# Patient Record
Sex: MALE | Race: WHITE | NOT HISPANIC OR LATINO | URBAN - METROPOLITAN AREA
[De-identification: names, ages, dates, MRNs, and addresses within clinical notes are randomized per-mention and may not be internally consistent; named-entity substitution may affect disease eponyms.]

---

## 2021-02-27 ENCOUNTER — EMERGENCY (EMERGENCY)
Facility: HOSPITAL | Age: 56
LOS: 1 days | Discharge: HOME | End: 2021-02-27
Attending: STUDENT IN AN ORGANIZED HEALTH CARE EDUCATION/TRAINING PROGRAM
Payer: MEDICAID

## 2021-02-27 VITALS
HEART RATE: 61 BPM | WEIGHT: 198.42 LBS | RESPIRATION RATE: 16 BRPM | OXYGEN SATURATION: 98 % | TEMPERATURE: 98 F | DIASTOLIC BLOOD PRESSURE: 82 MMHG | SYSTOLIC BLOOD PRESSURE: 137 MMHG

## 2021-02-27 PROCEDURE — 99284 EMERGENCY DEPT VISIT MOD MDM: CPT

## 2021-02-27 PROCEDURE — 99283 EMERGENCY DEPT VISIT LOW MDM: CPT | Mod: 25

## 2021-02-27 PROCEDURE — 73030 X-RAY EXAM OF SHOULDER: CPT | Mod: 26,LT

## 2021-02-27 PROCEDURE — 73030 X-RAY EXAM OF SHOULDER: CPT

## 2021-02-27 RX ORDER — IBUPROFEN 200 MG
600 TABLET ORAL ONCE
Refills: 0 | Status: COMPLETED | OUTPATIENT
Start: 2021-02-27 | End: 2021-02-27

## 2021-02-27 RX ADMIN — Medication 600 MILLIGRAM(S): at 20:23

## 2021-02-27 NOTE — ED PROVIDER NOTE - ATTENDING CONTRIBUTION TO CARE
Patient presenting s/p fall  endorses arm/shoulder pain  no deformity  sensation and rom intact  will obtain xray. ro fracture and dislocation, pain control and reassess

## 2021-02-27 NOTE — ED PROVIDER NOTE - OBJECTIVE STATEMENT
Polish translation provided by [employer]  Nery [id number].  54 y/o M with a significant PMHx of HTN presents to the ED with complaints of L shoulder pain. As per patient, he slipped and fell yesterday on his L arm; denies injury to other areas. Patient complaining of sharp L arm pain, rated 7/10 with movement and palpation. Denies LOC, head trauma, dizziness, weakness, numbness, tingling or any other acute complaints. Patient on amlodipine 10mg. Patient has unknown pain patch and reports taking Tylenol with moderate relief. No allergies. No significant past surgical history.

## 2021-02-27 NOTE — ED PROVIDER NOTE - PROGRESS NOTE DETAILS
No fracture, recommend analgesia. Pt is well appearing walking with steady gait, stable for discharge and follow up without fail with medical doctor. I had a detailed discussion with the patient and/or guardian regarding the historical points, exam findings, and any diagnostic results supporting the discharge diagnosis. Pt educated on care and need for follow up. Strict return instructions and red flag signs and symptoms discussed with patient. Questions answered. Pt shows understanding of discharge information and agrees to follow.

## 2021-02-27 NOTE — ED PROVIDER NOTE - UPPER EXTREMITY EXAM, LEFT
No pertinent family history in first degree relatives L extremity warm, unable to lift up arm, unable to bring arm across chest, positive Apley scratch test, clicking felt with ROM, no swelling noted, no erythema, skin intact/LIMITED ROM

## 2021-02-27 NOTE — ED PROVIDER NOTE - PATIENT PORTAL LINK FT
You can access the FollowMyHealth Patient Portal offered by [facility] by registering at the following website: [email or web address]. By joining [employer]’s FollowMyHealth portal, you will also be able to view your health information using other applications (apps) compatible with our system.

## 2021-02-27 NOTE — ED ADULT NURSE NOTE - OBJECTIVE STATEMENT
As per pt, c/o L shoulder pain s/p trip and fall on glass yesterday. NO other obvious traumas noted. Pt denies LOC, h/a, dizziness, blurred vision, f/c, n/v/d, CP, SOB, or cough.

## 2021-02-27 NOTE — ED PROVIDER NOTE - CHPI ED SYMPTOMS NEG
DISCHARGE PLANNING    • Discharge to home or other facility with appropriate resources Progressing        GASTROINTESTINAL - ADULT    • Minimal or absence of nausea and vomiting Progressing        GENITOURINARY - ADULT    • Absence of urinary retention Pro no LOC, no dizziness/no numbness/no tingling/no weakness

## 2021-07-16 ENCOUNTER — EMERGENCY (EMERGENCY)
Facility: HOSPITAL | Age: 56
LOS: 1 days | Discharge: HOME | End: 2021-07-16
Attending: EMERGENCY MEDICINE
Payer: MEDICAID

## 2021-07-16 VITALS
TEMPERATURE: 98 F | WEIGHT: 184.97 LBS | RESPIRATION RATE: 18 BRPM | OXYGEN SATURATION: 99 % | HEART RATE: 61 BPM | DIASTOLIC BLOOD PRESSURE: 80 MMHG | SYSTOLIC BLOOD PRESSURE: 139 MMHG

## 2021-07-16 LAB
ALBUMIN SERPL ELPH-MCNC: 3.9 G/DL — SIGNIFICANT CHANGE UP (ref 3.5–5)
ALP SERPL-CCNC: 77 U/L — SIGNIFICANT CHANGE UP (ref 40–120)
ALT FLD-CCNC: 34 U/L DA — SIGNIFICANT CHANGE UP (ref 10–60)
ANION GAP SERPL CALC-SCNC: 5 MMOL/L — SIGNIFICANT CHANGE UP (ref 5–17)
AST SERPL-CCNC: 22 U/L — SIGNIFICANT CHANGE UP (ref 10–40)
BASOPHILS # BLD AUTO: 0.03 K/UL — SIGNIFICANT CHANGE UP (ref 0–0.2)
BASOPHILS NFR BLD AUTO: 0.4 % — SIGNIFICANT CHANGE UP (ref 0–2)
BILIRUB SERPL-MCNC: 0.4 MG/DL — SIGNIFICANT CHANGE UP (ref 0.2–1.2)
BUN SERPL-MCNC: 16 MG/DL — SIGNIFICANT CHANGE UP (ref 7–18)
CALCIUM SERPL-MCNC: 9.2 MG/DL — SIGNIFICANT CHANGE UP (ref 8.4–10.5)
CHLORIDE SERPL-SCNC: 108 MMOL/L — SIGNIFICANT CHANGE UP (ref 96–108)
CK MB BLD-MCNC: 1.1 % — SIGNIFICANT CHANGE UP (ref 0–3.5)
CK MB CFR SERPL CALC: 1.1 NG/ML — SIGNIFICANT CHANGE UP (ref 0–3.6)
CK SERPL-CCNC: 101 U/L — SIGNIFICANT CHANGE UP (ref 35–232)
CO2 SERPL-SCNC: 27 MMOL/L — SIGNIFICANT CHANGE UP (ref 22–31)
CREAT SERPL-MCNC: 0.74 MG/DL — SIGNIFICANT CHANGE UP (ref 0.5–1.3)
EOSINOPHIL # BLD AUTO: 0.25 K/UL — SIGNIFICANT CHANGE UP (ref 0–0.5)
EOSINOPHIL NFR BLD AUTO: 3.5 % — SIGNIFICANT CHANGE UP (ref 0–6)
GLUCOSE SERPL-MCNC: 90 MG/DL — SIGNIFICANT CHANGE UP (ref 70–99)
HCT VFR BLD CALC: 43.3 % — SIGNIFICANT CHANGE UP (ref 39–50)
HGB BLD-MCNC: 14.9 G/DL — SIGNIFICANT CHANGE UP (ref 13–17)
IMM GRANULOCYTES NFR BLD AUTO: 0.3 % — SIGNIFICANT CHANGE UP (ref 0–1.5)
LYMPHOCYTES # BLD AUTO: 2.39 K/UL — SIGNIFICANT CHANGE UP (ref 1–3.3)
LYMPHOCYTES # BLD AUTO: 33.8 % — SIGNIFICANT CHANGE UP (ref 13–44)
MCHC RBC-ENTMCNC: 30.8 PG — SIGNIFICANT CHANGE UP (ref 27–34)
MCHC RBC-ENTMCNC: 34.4 GM/DL — SIGNIFICANT CHANGE UP (ref 32–36)
MCV RBC AUTO: 89.6 FL — SIGNIFICANT CHANGE UP (ref 80–100)
MONOCYTES # BLD AUTO: 0.52 K/UL — SIGNIFICANT CHANGE UP (ref 0–0.9)
MONOCYTES NFR BLD AUTO: 7.3 % — SIGNIFICANT CHANGE UP (ref 2–14)
NEUTROPHILS # BLD AUTO: 3.87 K/UL — SIGNIFICANT CHANGE UP (ref 1.8–7.4)
NEUTROPHILS NFR BLD AUTO: 54.7 % — SIGNIFICANT CHANGE UP (ref 43–77)
NRBC # BLD: 0 /100 WBCS — SIGNIFICANT CHANGE UP (ref 0–0)
PLATELET # BLD AUTO: 247 K/UL — SIGNIFICANT CHANGE UP (ref 150–400)
POTASSIUM SERPL-MCNC: 4.3 MMOL/L — SIGNIFICANT CHANGE UP (ref 3.5–5.3)
POTASSIUM SERPL-SCNC: 4.3 MMOL/L — SIGNIFICANT CHANGE UP (ref 3.5–5.3)
PROT SERPL-MCNC: 7.8 G/DL — SIGNIFICANT CHANGE UP (ref 6–8.3)
RBC # BLD: 4.83 M/UL — SIGNIFICANT CHANGE UP (ref 4.2–5.8)
RBC # FLD: 11.6 % — SIGNIFICANT CHANGE UP (ref 10.3–14.5)
SODIUM SERPL-SCNC: 140 MMOL/L — SIGNIFICANT CHANGE UP (ref 135–145)
TROPONIN I SERPL-MCNC: <0.015 NG/ML — SIGNIFICANT CHANGE UP (ref 0–0.04)
TROPONIN I SERPL-MCNC: <0.015 NG/ML — SIGNIFICANT CHANGE UP (ref 0–0.04)
WBC # BLD: 7.08 K/UL — SIGNIFICANT CHANGE UP (ref 3.8–10.5)
WBC # FLD AUTO: 7.08 K/UL — SIGNIFICANT CHANGE UP (ref 3.8–10.5)

## 2021-07-16 PROCEDURE — 80053 COMPREHEN METABOLIC PANEL: CPT

## 2021-07-16 PROCEDURE — 71046 X-RAY EXAM CHEST 2 VIEWS: CPT | Mod: 26

## 2021-07-16 PROCEDURE — 93010 ELECTROCARDIOGRAM REPORT: CPT

## 2021-07-16 PROCEDURE — 36415 COLL VENOUS BLD VENIPUNCTURE: CPT

## 2021-07-16 PROCEDURE — 71046 X-RAY EXAM CHEST 2 VIEWS: CPT

## 2021-07-16 PROCEDURE — 93005 ELECTROCARDIOGRAM TRACING: CPT

## 2021-07-16 PROCEDURE — 99283 EMERGENCY DEPT VISIT LOW MDM: CPT | Mod: 25

## 2021-07-16 PROCEDURE — 85025 COMPLETE CBC W/AUTO DIFF WBC: CPT

## 2021-07-16 PROCEDURE — 84484 ASSAY OF TROPONIN QUANT: CPT

## 2021-07-16 PROCEDURE — 82553 CREATINE MB FRACTION: CPT

## 2021-07-16 PROCEDURE — 99285 EMERGENCY DEPT VISIT HI MDM: CPT

## 2021-07-16 PROCEDURE — 82550 ASSAY OF CK (CPK): CPT

## 2021-07-16 NOTE — ED ADULT NURSE NOTE - OBJECTIVE STATEMENT
pt is here for chest pain.  As per family member, chest pain for 3 days, denied fever or sob, denied N/v/D, no distress noted at this time.

## 2021-07-16 NOTE — ED ADULT NURSE NOTE - NS ED NOTE ABUSE RESPONSE YN
Render Note In Bullet Format When Appropriate: No Detail Level: Simple Consent: The patient's consent was obtained including but not limited to risks of crusting, scabbing, blistering, scarring, darker or lighter pigmentary change, recurrence, incomplete removal and infection. Medical Necessity Information: It is in your best interest to select a reason for this procedure from the list below. All of these items fulfill various CMS LCD requirements except the new and changing color options. Number Of Freeze-Thaw Cycles: 2 freeze-thaw cycles Medical Necessity Clause: This procedure was medically necessary because the lesions that were treated were: Post-Care Instructions: I reviewed with the patient in detail post-care instructions. Patient is to wear sunprotection, and avoid picking at any of the treated lesions. Pt may apply Vaseline to crusted or scabbing areas. Duration Of Freeze Thaw-Cycle (Seconds): 5-10 Yes

## 2021-07-16 NOTE — ED PROVIDER NOTE - PATIENT PORTAL LINK FT
You can access the FollowMyHealth Patient Portal offered by [facility] by registering at the following website: [email or web address]. By joining [employer]’s FollowMyHealth portal, you will also be able to view your health information using other applications (apps) compatible with our system.

## 2021-07-16 NOTE — ED PROVIDER NOTE - PROGRESS NOTE DETAILS
trop negative x2, CXR negative.  pt reassessed, chest pain free.  Will dc.  pt given copy of all results.

## 2021-07-16 NOTE — ED PROVIDER NOTE - NSFOLLOWUPINSTRUCTIONS_ED_ALL_ED_FT
Chest Pain    WHAT YOU NEED TO KNOW:    Chest pain can be caused by a range of conditions, from not serious to life-threatening. Chest pain can be a symptom of a digestive problem, such as acid reflux or a stomach ulcer. An anxiety attack or a strong emotion, such as anger, can also cause chest pain. Infection, inflammation, or a fracture in the bones or cartilage in your chest can cause pain or discomfort. Sometimes chest pain or pressure is caused by poor blood flow to your heart (angina). Chest pain may also be caused by life-threatening conditions such as a heart attack or blood clot in your lungs.    DISCHARGE INSTRUCTIONS:    Call your local emergency number (911 in the ) or have someone call if:   •You have any of the following signs of a heart attack: ?Squeezing, pressure, or pain in your chest      ?You may also have any of the following: ?Discomfort or pain in your back, neck, jaw, stomach, or arm      ?Shortness of breath      ?Nausea or vomiting      ?Lightheadedness or a sudden cold sweat            Seek care immediately if:   •You have chest discomfort that gets worse, even with medicine.      •You cough or vomit blood.      •Your bowel movements are black or bloody.      •You cannot stop vomiting, or it hurts to swallow.      Call your doctor if:   •You have questions or concerns about your condition or care.          Medicines:   •Medicines may be given to treat the cause of your chest pain. Examples include pain medicine, anxiety medicine, or medicines to increase blood flow to your heart.      •Do not take certain medicines without asking your healthcare provider first. These include NSAIDs, herbal or vitamin supplements, or hormones (estrogen or progestin).      •Take your medicine as directed. Contact your healthcare provider if you think your medicine is not helping or if you have side effects. Tell him or her if you are allergic to any medicine. Keep a list of the medicines, vitamins, and herbs you take. Include the amounts, and when and why you take them. Bring the list or the pill bottles to follow-up visits. Carry your medicine list with you in case of an emergency.      Healthy living tips: The following are general healthy guidelines. If the cause of your chest pain is known, your healthcare provider will give you specific guidelines to follow.  •Do not smoke. Nicotine and other chemicals in cigarettes and cigars can cause lung and heart damage. Ask your healthcare provider for information if you currently smoke and need help to quit. E-cigarettes or smokeless tobacco still contain nicotine. Talk to your healthcare provider before you use these products.      •Choose a variety of healthy foods as often as possible. Include fresh, frozen, or canned fruits and vegetables. Also include low-fat dairy products, fish, chicken (without skin), and lean meats. Your healthcare provider or a dietitian can help you create meal plans. You may need to avoid certain foods or drinks if your pain is caused by a digestion problem.  Healthy Foods           •Lower your sodium (salt) intake. Limit foods that are high in sodium, such as canned foods, salty snacks, and cold cuts. If you add salt when you cook food, do not add more at the table. Choose low-sodium canned foods as much as possible.             •Drink plenty of water every day. Water helps your body to control temperature and blood pressure. Ask your healthcare provider how much water you should drink every day.      •Ask about activity. Your healthcare provider will tell you which activities to limit or avoid. Ask when you can drive, return to work, and have sex. Ask about the best exercise plan for you.      •Maintain a healthy weight. Ask your healthcare provider what a healthy weight is for you. Ask him or her to help you create a safe weight loss plan if you are overweight.      •Ask about vaccines you may need. Get the influenza (flu) vaccine every year as soon as recommended, usually in September or October. You may also need a pneumococcal vaccine to prevent pneumonia. The vaccine is usually given every 5 years, starting at age 65. Your healthcare provider can tell you if should get other vaccines, and when to get them.      Follow up with your healthcare provider within 72 hours, or as directed: You may need to return for more tests to find the cause of your chest pain. You may be referred to a specialist, such as a cardiologist or gastroenterologist. Write down your questions so you remember to ask them during your visits.       © Copyright [employer] 2021           back to top                          © Copyright [employer] 2021

## 2021-07-16 NOTE — ED PROVIDER NOTE - OBJECTIVE STATEMENT
Patient is Turkmen speaker,  offered, patient refers to use son. 56 y.o male with history of HTN presents to the ED complaining of chest pain associated with shortness of breath for x3 days, that's worse today. Patient denies drinking and smoking. Patient reports that her symptoms are worse with exertion, but resolves with rest. Patient also reports that her chest pain is on the left side, with no radiation.  Patient denies fever, cough, nausea, vomiting, diaphoresis, or any other complaints. NKDA.

## 2021-07-17 PROBLEM — I10 ESSENTIAL (PRIMARY) HYPERTENSION: Chronic | Status: ACTIVE | Noted: 2021-02-27

## 2021-12-27 ENCOUNTER — APPOINTMENT (OUTPATIENT)
Dept: SURGERY | Facility: CLINIC | Age: 56
End: 2021-12-27
Payer: MEDICAID

## 2021-12-27 VITALS
HEART RATE: 80 BPM | SYSTOLIC BLOOD PRESSURE: 143 MMHG | WEIGHT: 197 LBS | HEIGHT: 67 IN | DIASTOLIC BLOOD PRESSURE: 83 MMHG | BODY MASS INDEX: 30.92 KG/M2

## 2021-12-27 VITALS — TEMPERATURE: 96.4 F

## 2021-12-27 DIAGNOSIS — Z78.9 OTHER SPECIFIED HEALTH STATUS: ICD-10-CM

## 2021-12-27 DIAGNOSIS — Z86.79 PERSONAL HISTORY OF OTHER DISEASES OF THE CIRCULATORY SYSTEM: ICD-10-CM

## 2021-12-27 DIAGNOSIS — Z82.5 FAMILY HISTORY OF ASTHMA AND OTHER CHRONIC LOWER RESPIRATORY DISEASES: ICD-10-CM

## 2021-12-27 PROBLEM — Z00.00 ENCOUNTER FOR PREVENTIVE HEALTH EXAMINATION: Status: ACTIVE | Noted: 2021-12-27

## 2021-12-27 PROCEDURE — 99243 OFF/OP CNSLTJ NEW/EST LOW 30: CPT

## 2021-12-27 PROCEDURE — 99203 OFFICE O/P NEW LOW 30 MIN: CPT

## 2021-12-27 RX ORDER — AMLODIPINE BESYLATE 5 MG/1
TABLET ORAL
Refills: 0 | Status: ACTIVE | COMMUNITY

## 2021-12-27 NOTE — HISTORY OF PRESENT ILLNESS
[de-identified] : This is a 56 year  old patient who was referred by   Dr Mccracken with the chief complaint of having  back mass.  He reports having this condition for 10 years. He denies any trauma to the area.   He denies any fever or  night sweats. Appetite is good and weight is stable.  He states that recently the mass started to  get  bigger and  more symptomatic. He wants to know if it could  be surgically  removed.\par \par

## 2021-12-27 NOTE — PHYSICAL EXAM
[de-identified] : midback mass is mobile, Firm, Smooth, non-tender,   Well defined. Superficial. No palpable lymph nodes.   Mass size - 4  cm x   3 cm.

## 2021-12-27 NOTE — PLAN
[FreeTextEntry1] : Mr. AARON  was told significance of findings, options, risks and benefits were explained.  Informed consent for excision midback mass and potential risks, benefits and alternatives (surgical options were discussed including non-surgical options or the option of no surgery) to the planned surgery were discussed in depth.  All surgical options were discussed including non-surgical treatments.  He wishes to proceed with surgery.  We will plan for surgery on at the next available date, pending any required insurance pre-certification or pre-approval. He agrees to obtain any necessary pre-operative evaluations and testing prior to surgery.\par Patient advised to seek immediate medical attention with any acute change in symptoms or with the development of any new or worsening symptoms.  Patient's questions and concerns addressed to patient's satisfaction, and patient verbalized an understanding of the information discussed.\par \par

## 2021-12-27 NOTE — CONSULT LETTER
[Dear  ___] : Dear  [unfilled], [Consult Letter:] : I had the pleasure of evaluating your patient, [unfilled]. [Please see my note below.] : Please see my note below. [Consult Closing:] : Thank you very much for allowing me to participate in the care of this patient.  If you have any questions, please do not hesitate to contact me. [Sincerely,] : Sincerely, [FreeTextEntry3] : Nick Merchant MD, FACS

## 2022-01-03 ENCOUNTER — OUTPATIENT (OUTPATIENT)
Dept: OUTPATIENT SERVICES | Facility: HOSPITAL | Age: 57
LOS: 1 days | End: 2022-01-03
Payer: MEDICAID

## 2022-01-03 VITALS
DIASTOLIC BLOOD PRESSURE: 83 MMHG | TEMPERATURE: 98 F | HEART RATE: 60 BPM | RESPIRATION RATE: 16 BRPM | HEIGHT: 66 IN | WEIGHT: 195.11 LBS | SYSTOLIC BLOOD PRESSURE: 131 MMHG | OXYGEN SATURATION: 100 %

## 2022-01-03 DIAGNOSIS — Z01.818 ENCOUNTER FOR OTHER PREPROCEDURAL EXAMINATION: ICD-10-CM

## 2022-01-03 DIAGNOSIS — M79.89 OTHER SPECIFIED SOFT TISSUE DISORDERS: ICD-10-CM

## 2022-01-03 PROCEDURE — G0463: CPT

## 2022-01-03 NOTE — H&P PST ADULT - NS PRO FEM  PAP SMEARS 3YRS
Called mom and provided Dr. Rawland Cabot instructions. Per mom almost at the ED and verbalized understanding. not applicable (Male)

## 2022-01-03 NOTE — H&P PST ADULT - PROBLEM SELECTOR PLAN 3
Continue Atorvastatin and take with sips of water on day of surgery.  Follow-up with PCP postop for management.

## 2022-01-03 NOTE — H&P PST ADULT - PROBLEM SELECTOR PLAN 2
Bahamian translation provided , , written instructions explained , pt verbalized understanding of  instructions including how to use shower in am of sx, bottle of Chlorhexidine given to pt for pt for shower. NPO since midnight

## 2022-01-03 NOTE — H&P PST ADULT - NEGATIVE ENMT SYMPTOMS
no hearing difficulty/no ear pain/no tinnitus/no vertigo/no gum bleeding/no dry mouth/no throat pain

## 2022-01-03 NOTE — H&P PST ADULT - NEGATIVE GASTROINTESTINAL SYMPTOMS
How Severe Is Your Skin Discoloration?: mild no nausea/no vomiting/no diarrhea/no constipation/no change in bowel habits/no flatulence

## 2022-01-03 NOTE — H&P PST ADULT - NEGATIVE NEUROLOGICAL SYMPTOMS
no transient paralysis/no weakness/no tremors/no vertigo/no difficulty walking/no headache/no hemiparesis

## 2022-01-03 NOTE — H&P PST ADULT - PROBLEM SELECTOR PLAN 1
TAMI stop bang scale 3 , intermediate risk for TAMI, OR booking notified , maintain  TAMI precautions, monitor pr close in periop time

## 2022-01-03 NOTE — H&P PST ADULT - ASSESSMENT
56 years old male PMH HTN and HLD presented to [facility] for evaluation before sx , pt was dx with other specified soft tissue disorders and is scheduled for excision of midback mass on 1/5/2022.

## 2022-01-03 NOTE — H&P PST ADULT - DOES PATIENT HAVE ADVANCE DIRECTIVE
son to be called in case of emergency/No son to be called in case of emergency to make medical decisions/No

## 2022-01-03 NOTE — H&P PST ADULT - NSICDXFAMILYHX_GEN_ALL_CORE_FT
FAMILY HISTORY:  Father  Still living? No  FH: asthma, Age at diagnosis: Age Unknown    Mother  Still living? No  FHx: kidney failure, Age at diagnosis: Age Unknown

## 2022-01-04 DIAGNOSIS — I10 ESSENTIAL (PRIMARY) HYPERTENSION: ICD-10-CM

## 2022-01-04 DIAGNOSIS — G47.33 OBSTRUCTIVE SLEEP APNEA (ADULT) (PEDIATRIC): ICD-10-CM

## 2022-01-04 DIAGNOSIS — Z29.9 ENCOUNTER FOR PROPHYLACTIC MEASURES, UNSPECIFIED: ICD-10-CM

## 2022-01-04 DIAGNOSIS — E78.5 HYPERLIPIDEMIA, UNSPECIFIED: ICD-10-CM

## 2022-01-04 DIAGNOSIS — M79.89 OTHER SPECIFIED SOFT TISSUE DISORDERS: ICD-10-CM

## 2022-01-10 LAB — SARS-COV-2 N GENE NPH QL NAA+PROBE: DETECTED

## 2022-01-14 PROBLEM — E78.5 HYPERLIPIDEMIA, UNSPECIFIED: Chronic | Status: ACTIVE | Noted: 2022-01-03

## 2022-02-09 ENCOUNTER — OUTPATIENT (OUTPATIENT)
Dept: OUTPATIENT SERVICES | Facility: HOSPITAL | Age: 57
LOS: 1 days | End: 2022-02-09
Payer: MEDICAID

## 2022-02-09 VITALS
HEART RATE: 61 BPM | RESPIRATION RATE: 18 BRPM | DIASTOLIC BLOOD PRESSURE: 76 MMHG | WEIGHT: 190.04 LBS | TEMPERATURE: 99 F | SYSTOLIC BLOOD PRESSURE: 116 MMHG | HEIGHT: 66 IN | OXYGEN SATURATION: 98 %

## 2022-02-09 DIAGNOSIS — M79.89 OTHER SPECIFIED SOFT TISSUE DISORDERS: ICD-10-CM

## 2022-02-09 DIAGNOSIS — Z91.89 OTHER SPECIFIED PERSONAL RISK FACTORS, NOT ELSEWHERE CLASSIFIED: ICD-10-CM

## 2022-02-09 DIAGNOSIS — I10 ESSENTIAL (PRIMARY) HYPERTENSION: ICD-10-CM

## 2022-02-09 DIAGNOSIS — E78.5 HYPERLIPIDEMIA, UNSPECIFIED: ICD-10-CM

## 2022-02-09 DIAGNOSIS — K21.9 GASTRO-ESOPHAGEAL REFLUX DISEASE WITHOUT ESOPHAGITIS: ICD-10-CM

## 2022-02-09 DIAGNOSIS — Z01.818 ENCOUNTER FOR OTHER PREPROCEDURAL EXAMINATION: ICD-10-CM

## 2022-02-09 DIAGNOSIS — R22.2 LOCALIZED SWELLING, MASS AND LUMP, TRUNK: ICD-10-CM

## 2022-02-09 PROCEDURE — G0463: CPT

## 2022-02-09 RX ORDER — SODIUM CHLORIDE 9 MG/ML
3 INJECTION INTRAMUSCULAR; INTRAVENOUS; SUBCUTANEOUS EVERY 8 HOURS
Refills: 0 | Status: DISCONTINUED | OUTPATIENT
Start: 2022-02-11 | End: 2022-02-18

## 2022-02-09 NOTE — H&P PST ADULT - PROBLEM SELECTOR PLAN 3
TAMI stop bang score 4. Pt never had sleep studies done, is at intermediate risk for sleep apnea and is at risk for pulmonary complications. Perioperative pulmonary precautions to be maintained.

## 2022-02-09 NOTE — H&P PST ADULT - PROBLEM SELECTOR PLAN 1
Excision of mid back mass on 02/11/2022. Pt optimized for procedure by PCP.   Preoperative instructions discussed with pt and given to pt. Instructed pt to notify security when he arrives in the lobby of the hospital that he is here for surgery, that he will need someone to come to the hospital to pick him up after surgery, not to eat or drink anything after midnight the night before the surgery, to avoid NSAIDs such as Ibuprofen, Motrin, Aleve, Advil, naproxen before surgery, to take Tylenol if needed for pain, to report if he has been exposed to anyone with any contagious diseases including Covid-19 or if he is exhibiting any symptoms of COVID-19,  to keep appointment for COVID-19  test 3 days before surgery. Instructed about use of Chlorhexidine 4% soap before surgery. Verbalized understanding of instructions given. Excision of mid back mass on 02/11/2022. Pt optimized for procedure by PCP.   Preoperative instructions discussed with pt via Citizen of [place] speaking wife and wife. Written instructions given to pt. Instructed pt to notify security when he arrives in the lobby of the hospital that he is here for surgery, that he will need someone to come to the hospital to pick him up after surgery, not to eat or drink anything after midnight the night before the surgery, to avoid NSAIDs such as Ibuprofen, Motrin, Aleve, Advil, naproxen before surgery, to take Tylenol if needed for pain, to report if he has been exposed to anyone with any contagious diseases including Covid-19 or if he is exhibiting any symptoms of COVID-19. Instructed about use of Chlorhexidine 4% soap before coming to the hospital the morning before surgery. Verbalized understanding of instructions given.

## 2022-02-09 NOTE — H&P PST ADULT - NEGATIVE GASTROINTESTINAL SYMPTOMS
no nausea/no vomiting/no diarrhea/no constipation no nausea/no vomiting/no diarrhea/no constipation/no change in bowel habits/no flatulence/no abdominal pain/no melena

## 2022-02-09 NOTE — H&P PST ADULT - PROBLEM SELECTOR PLAN 2
Instructed to continue Amlodipine and to take it with sips of water on day of surgery. Follow-up with PCP postop for lipid management.

## 2022-02-09 NOTE — H&P PST ADULT - ASSESSMENT
This is a 56 yr old Russian male with PMH of HTN, HLD, GERD, COVID-19 virus infection in December 2021 who presents with midback mass. Pt is scheduled for excision of mid back mass on 02/11/2022.  TAMI stop bang score 4. Pt never had sleep studies done, is at intermediate risk for sleep apnea and is at risk for pulmonary complications.

## 2022-02-09 NOTE — H&P PST ADULT - NSICDXPASTMEDICALHX_GEN_ALL_CORE_FT
PAST MEDICAL HISTORY:  HLD (hyperlipidemia)     HTN (hypertension)     Mass on back      PAST MEDICAL HISTORY:  COVID-19 virus infection December 2021    GERD (gastroesophageal reflux disease)     HLD (hyperlipidemia)     HTN (hypertension)     Mass on back

## 2022-02-09 NOTE — H&P PST ADULT - HISTORY OF PRESENT ILLNESS
This is a 56 yr old Russian male with PMH of HTN, HLD, GERD who presents with c/o midback mass for years that has been enlarging and causing discomfort. Pt is scheduled for excision of mid back mass on 02/11/2022. This is a 56 yr old Russian male with PMH of HTN, HLD, GERD, COVID-19 virus infection in December 2021 who presents with c/o midback mass for years that has been enlarging and causing discomfort. Pt is scheduled for excision of mid back mass on 02/11/2022.

## 2022-02-10 ENCOUNTER — TRANSCRIPTION ENCOUNTER (OUTPATIENT)
Age: 57
End: 2022-02-10

## 2022-02-11 ENCOUNTER — RESULT REVIEW (OUTPATIENT)
Age: 57
End: 2022-02-11

## 2022-02-11 ENCOUNTER — OUTPATIENT (OUTPATIENT)
Dept: OUTPATIENT SERVICES | Facility: HOSPITAL | Age: 57
LOS: 1 days | End: 2022-02-11
Payer: MEDICAID

## 2022-02-11 ENCOUNTER — APPOINTMENT (OUTPATIENT)
Dept: SURGERY | Facility: HOSPITAL | Age: 57
End: 2022-02-11
Payer: MEDICAID

## 2022-02-11 VITALS
HEART RATE: 70 BPM | TEMPERATURE: 98 F | SYSTOLIC BLOOD PRESSURE: 138 MMHG | RESPIRATION RATE: 18 BRPM | DIASTOLIC BLOOD PRESSURE: 81 MMHG | OXYGEN SATURATION: 96 %

## 2022-02-11 VITALS
HEIGHT: 66 IN | SYSTOLIC BLOOD PRESSURE: 116 MMHG | WEIGHT: 190.04 LBS | DIASTOLIC BLOOD PRESSURE: 76 MMHG | RESPIRATION RATE: 18 BRPM | OXYGEN SATURATION: 98 % | TEMPERATURE: 99 F | HEART RATE: 61 BPM

## 2022-02-11 DIAGNOSIS — M79.89 OTHER SPECIFIED SOFT TISSUE DISORDERS: ICD-10-CM

## 2022-02-11 DIAGNOSIS — Z01.818 ENCOUNTER FOR OTHER PREPROCEDURAL EXAMINATION: ICD-10-CM

## 2022-02-11 PROCEDURE — 21931 EXC BACK LES SC 3 CM/>: CPT | Mod: AS

## 2022-02-11 PROCEDURE — 11406 EXC TR-EXT B9+MARG >4.0 CM: CPT

## 2022-02-11 PROCEDURE — 88305 TISSUE EXAM BY PATHOLOGIST: CPT | Mod: 26

## 2022-02-11 PROCEDURE — 12032 INTMD RPR S/A/T/EXT 2.6-7.5: CPT

## 2022-02-11 PROCEDURE — 21931 EXC BACK LES SC 3 CM/>: CPT

## 2022-02-11 PROCEDURE — 88305 TISSUE EXAM BY PATHOLOGIST: CPT

## 2022-02-11 RX ORDER — OXYCODONE AND ACETAMINOPHEN 5; 325 MG/1; MG/1
1 TABLET ORAL EVERY 4 HOURS
Refills: 0 | Status: DISCONTINUED | OUTPATIENT
Start: 2022-02-11 | End: 2022-02-11

## 2022-02-11 RX ORDER — ATORVASTATIN CALCIUM 80 MG/1
1 TABLET, FILM COATED ORAL
Qty: 0 | Refills: 0 | DISCHARGE

## 2022-02-11 RX ORDER — OMEPRAZOLE 10 MG/1
1 CAPSULE, DELAYED RELEASE ORAL
Qty: 0 | Refills: 0 | DISCHARGE

## 2022-02-11 RX ORDER — AMLODIPINE BESYLATE 2.5 MG/1
1 TABLET ORAL
Qty: 0 | Refills: 0 | DISCHARGE

## 2022-02-11 RX ADMIN — SODIUM CHLORIDE 3 MILLILITER(S): 9 INJECTION INTRAMUSCULAR; INTRAVENOUS; SUBCUTANEOUS at 10:51

## 2022-02-11 NOTE — ASU DISCHARGE PLAN (ADULT/PEDIATRIC) - NS MD DC FALL RISK RISK
For information on Fall & Injury Prevention, visit: [email or web address] OR  [email or web address] OR  https://www.cdc.gov/steadi/patient.html

## 2022-02-11 NOTE — ASU DISCHARGE PLAN (ADULT/PEDIATRIC) - CALL YOUR DOCTOR IF YOU HAVE ANY OF THE FOLLOWING:
Fever greater than (need to indicate Fahrenheit or Celsius) Benzoyl Peroxide Pregnancy And Lactation Text: This medication is Pregnancy Category C. It is unknown if benzoyl peroxide is excreted in breast milk.

## 2022-02-11 NOTE — ASU PATIENT PROFILE, ADULT - FALL HARM RISK - UNIVERSAL INTERVENTIONS
Bed in lowest position, wheels locked, appropriate side rails in place/Call bell, personal items and telephone in reach/Instruct patient to call for assistance before getting out of bed or chair/Non-slip footwear when patient is out of bed/[place] to call system/Physically safe environment - no spills, clutter or unnecessary equipment/Purposeful Proactive Rounding/Room/bathroom lighting operational, light cord in reach

## 2022-02-11 NOTE — ASU PATIENT PROFILE, ADULT - NSICDXPASTMEDICALHX_GEN_ALL_CORE_FT
PAST MEDICAL HISTORY:  COVID-19 virus infection December 2021    GERD (gastroesophageal reflux disease)     HLD (hyperlipidemia)     HTN (hypertension)     Mass on back

## 2022-02-11 NOTE — ASU DISCHARGE PLAN (ADULT/PEDIATRIC) - CARE PROVIDER_API CALL
Nick Merchant)  Surgery  [address], [place] [address]  Phone: [phone]  Fax: [phone]  Follow Up Time:

## 2022-02-11 NOTE — ASU PATIENT PROFILE, ADULT - NSICDXNOPASTSURGICALHX_GEN_ALL_CORE
Instructed to call immediately if any new distortion, blurring, decreased vision or eye pain. <-- Click to add NO significant Past Surgical History

## 2022-02-17 LAB — SURGICAL PATHOLOGY STUDY: SIGNIFICANT CHANGE UP

## 2022-02-23 VITALS — TEMPERATURE: 97.3 F

## 2022-02-23 PROBLEM — R22.2 LOCALIZED SWELLING, MASS AND LUMP, TRUNK: Chronic | Status: ACTIVE | Noted: 2022-02-09

## 2022-02-23 PROBLEM — U07.1 COVID-19: Chronic | Status: ACTIVE | Noted: 2022-02-09

## 2022-02-23 PROBLEM — K21.9 GASTRO-ESOPHAGEAL REFLUX DISEASE WITHOUT ESOPHAGITIS: Chronic | Status: ACTIVE | Noted: 2022-02-09

## 2022-02-24 ENCOUNTER — APPOINTMENT (OUTPATIENT)
Dept: SURGERY | Facility: CLINIC | Age: 57
End: 2022-02-24
Payer: MEDICAID

## 2022-02-24 VITALS — TEMPERATURE: 97.3 F

## 2022-02-24 DIAGNOSIS — M79.89 OTHER SPECIFIED SOFT TISSUE DISORDERS: ICD-10-CM

## 2022-02-24 PROCEDURE — 99024 POSTOP FOLLOW-UP VISIT: CPT

## 2022-02-24 NOTE — HISTORY OF PRESENT ILLNESS
[de-identified] : Mr. AARON  is s/p excision midback mass on 02/11/2022. Patient's pathology results were  consistent with Epidermal inclusion cyst. Today  Mr. AARON offers no complaints. patient reports no fever or  chills. patient reports occasional discomfort in the surgical area.  His surgical incisions are healing well. No signs of inflammation, infection or exudate. patient reports good bowel movements and appetite.

## 2022-02-24 NOTE — PHYSICAL EXAM
[Calm] : calm [de-identified] : He  is alert, well-groomed, and in NAD\par   [de-identified] : back Surgical wound is healing well.   no signs of  inflammation or infection.

## 2022-02-24 NOTE — ASSESSMENT
[FreeTextEntry1] : Mr. AARON is doing well, with excellent post-operative recovery. The surgical incision is healing well and as expected. There is no evidence of infection or complication, and patient is progressing as expected. Post-operative wound care, activity, restrictions and precautions reinforced.  Pathology results were discussed in details. Patient's questions and concerns addressed to patient's satisfaction.\par

## 2022-02-24 NOTE — DATA REVIEWED
[FreeTextEntry1] : [facility] Accession Number : [id number]\par Patient:   CAITLYN AARON\par \par \par Accession:                             [id number]\par \par Collected Date/Time:                   2/11/2022 15:30 EST\par Received Date/Time:                    2/14/2022 08:00 EST\par \par Surgical Pathology Report - Auth (Verified)\par \par Specimen(s) Submitted\par 1  Mid back mass\par \par Final Diagnosis\par \par Mass, mid back; excision:\par - Epidermal inclusion cyst.\par \par Verified by: Perla London MD\par (Electronic Signature)\par Reported on: 02/17/22 21:58 EST, [address]\par Phone: [phone]   Fax: [phone]\par _________________________________________________________________\par \par Clinical Information\par Mid back mass\par

## 2022-02-24 NOTE — PLAN
[FreeTextEntry1] : Mr. AARON will follow up  if needed. Warning signs, follow up, and restrictions were discussed with the patient.

## 2022-12-29 NOTE — ED PROVIDER NOTE - NS ED ATTENDING STATEMENT MOD
You have been referred to physical therapy. Please call to set-up an evaluation. Number of times per week and number of weeks to attend therapy will be determined by your physical therapist.    [facility] physical, occupational, and speech therapy     [address] [place] [address] [place] [address]   Telephone: [phone]   Fax: [phone]    Hours:  6 a.m. To 6:30 p.m., Monday through Thursday  6 a.m. To 4:30 p.m., Friday  
Attending Only

## 2023-07-21 NOTE — ASU PATIENT PROFILE, ADULT - NSALCOHOLAMT_GEN_A_CORE_SD
Pre assessment completed for upcoming procedure.   (Please see previous telephone encounter notes for complete details)    Patient  returned call.       Procedure details:    Arrival time and facility location reviewed    Pre op exam needed? N/A    Designated  policy reviewed. Instructed to have someone stay 6 hours post procedure.     COVID policy reviewed.      Medication review:    Medications reviewed. Please see supporting documentation below. Holding recommendations discussed (if applicable).       Prep for procedure:     Reviewed procedure prep instructions. Patient requesting Golytely prep as they prefer it. Bowel prep has been sent to    [id number] - [place], [place] - [address]     Golytely prep instruction sent via [employer].     Additional information needed?  N/A      Patient  verbalized understanding and had no questions or concerns at this time.      Maria E Jimenez RN  Endoscopy Procedure Pre Assessment RN  [phone] option 4   1-2 drinks

## 2023-08-10 NOTE — ED ADULT NURSE NOTE - ISOLATION TYPE:
Type of Form Received:     Form Received (Date) 8/10/23   Form Filled out Yes, 8/11/23   Placed in provider folder Yes      None

## 2023-10-03 NOTE — ED ADULT TRIAGE NOTE - TEMPERATURE IN CELSIUS (DEGREES C)
Subjective   History of Present Illness  Hiram Pabon is a 43-year-old white male who presents secondary to lower abdominal pain x2 weeks.  Patient was pain-free at 1 point.  However he has had constant pain for the past week and a half.  Patient thought at one point he was constipated.  He took laxatives x2.  Patient had a bowel movement but no improvement of his pain.  Symptoms worsened with eating.  Patient elected to go to urgent care this morning.  He was sent to the ED for full evaluation.  Patient has a strong family history of diverticulitis.  He feels this is the cause of his pain.  Mr. Pabon has personally never been diagnosed with diverticulosis or diverticulitis.  Patient presents for evaluation.    History provided by:  Patient    Review of Systems   Constitutional:  Negative for chills and fever.   HENT:  Negative for rhinorrhea.    Eyes:  Negative for redness.   Respiratory:  Negative for cough.    Cardiovascular:  Negative for chest pain.   Gastrointestinal:  Positive for abdominal pain and constipation.   Genitourinary:  Negative for dysuria.   Musculoskeletal:  Negative for back pain.   Skin:  Negative for rash.   Neurological:  Negative for syncope.   All other systems reviewed and are negative.    History reviewed. No pertinent past medical history.    No Known Allergies    History reviewed. No pertinent surgical history.    History reviewed. No pertinent family history.    Social History     Socioeconomic History    Marital status:    Tobacco Use    Smoking status: Every Day   Substance and Sexual Activity    Alcohol use: Yes           Objective   Physical Exam  Vitals and nursing note reviewed.   Constitutional:       General: He is not in acute distress.     Appearance: Normal appearance. He is well-developed. He is not ill-appearing, toxic-appearing or diaphoretic.      Comments: 42 yo WM lying in bed.  Patient appears in good overall health.  Mr. Nick is a bit overweight.  Vital  signs unremarkable.  Patient friendly and cooperative.   HENT:      Head: Normocephalic and atraumatic.      Right Ear: Tympanic membrane, ear canal and external ear normal.      Left Ear: Tympanic membrane, ear canal and external ear normal.      Nose: Nose normal.      Mouth/Throat:      Mouth: Mucous membranes are moist.      Pharynx: Oropharynx is clear.   Eyes:      Extraocular Movements: Extraocular movements intact.      Conjunctiva/sclera: Conjunctivae normal.      Pupils: Pupils are equal, round, and reactive to light.   Cardiovascular:      Rate and Rhythm: Normal rate and regular rhythm.      Heart sounds: Normal heart sounds. No murmur heard.    No friction rub. No gallop.   Pulmonary:      Effort: Pulmonary effort is normal. No respiratory distress.      Breath sounds: Normal breath sounds. No stridor. No wheezing or rales.   Abdominal:      General: There is no distension.      Palpations: Abdomen is soft. There is no mass.      Tenderness: There is abdominal tenderness in the right lower quadrant, suprapubic area and left lower quadrant. There is no guarding or rebound.      Hernia: No hernia is present.   Musculoskeletal:         General: Normal range of motion.      Cervical back: Normal range of motion and neck supple.   Skin:     General: Skin is warm and dry.      Findings: No erythema or rash.   Neurological:      General: No focal deficit present.      Mental Status: He is alert and oriented to person, place, and time.      Cranial Nerves: No cranial nerve deficit.      Deep Tendon Reflexes: Reflexes are normal and symmetric.   Psychiatric:         Mood and Affect: Mood normal.         Behavior: Behavior normal.       Procedures           ED Course  ED Course as of 10/03/23 1449   Tue Oct 03, 2023   1031 Strong family history of diverticulitis.  Obtaining routine labs as well as CT with IV and oral contrast.  Patient declined offer for pain and nausea medications at this time. [SS]   1218  WBC(!): 18.24 [SS]   1218 Neutrophils Absolute(!): 13.10 [SS]   1218 Immature Grans, Absolute(!): 0.14 [SS]   1218 CBC shows leukocytosis of 18 point two 4K with a left shift.  Absolute neutrophils elevated at [SS]   1218  13.1 K.  CMP shows slightly elevated ALT at 61.  Alk phos elevated 142.  Otherwise unremarkable. [SS]   1410 CT shows evidence of diverticulitis with adjacent inflammatory changes including a portion of the bladder.  No abscess at present.  No perforation.  I discussed with Mr. Pabon my recommendation for IV antibiotics.  Mr. Pabon does not have health insurance.  Thus he is declining IV antibiotics.  Will prescribe oral antibiotics for home.  Also discussed with Mr. Santoyo indications return to the ED.  Will DC home.    Prescription  1-Augmentin   [SS]      ED Course User Index  [SS] David Yanez MD      Labs Reviewed   COMPREHENSIVE METABOLIC PANEL - Abnormal; Notable for the following components:       Result Value    Sodium 135 (*)     Chloride 95 (*)     ALT (SGPT) 61 (*)     Alkaline Phosphatase 142 (*)     All other components within normal limits    Narrative:     GFR Normal >60  Chronic Kidney Disease <60  Kidney Failure <15     CBC WITH AUTO DIFFERENTIAL - Abnormal; Notable for the following components:    WBC 18.24 (*)     Lymphocyte % 16.2 (*)     Immature Grans % 0.8 (*)     Neutrophils, Absolute 13.10 (*)     Monocytes, Absolute 1.91 (*)     Immature Grans, Absolute 0.14 (*)     All other components within normal limits   CBC AND DIFFERENTIAL    Narrative:     The following orders were created for panel order CBC & Differential.  Procedure                               Abnormality         Status                     ---------                               -----------         ------                     CBC Auto Differential[[id number]]         Abnormal            Final result                 Please view results for these tests on the individual orders.       CT Abdomen Pelvis  With Contrast    Result Date: 10/3/2023  Narrative: CT abdomen and pelvis with contrast   10/3/2023  HISTORY: Left lower quadrant abdominal pain today  COMPARISON: NONE  TECHNIQUE:  CT of Abdomen and Pelvis with contrast performed.  Sagittal and Coronal reconstructions performed. Radiation dose reduction techniques included automated exposure control or exposure modulation based on body size. Radiation audit for CT and nuclear cardiology exams in the last 12 months: 0.  FINDINGS:  Abdomen: Lung bases are clear there is a 15 mm low-density lesion in the right lobe of the liver just under the diaphragm. Liver is otherwise normal. Spleen, pancreas, adrenal glands and kidneys are normal in appearance. Aorta is normal in size. There is no adenopathy. Oral contrast was administered. There is marked wall thickening throughout the sigmoid colon with numerous diverticuli. The wall thickening involves the segment at least 10 cm in length. Anterior to this segment, there is a large area of inflammation measuring up to 8 cm in diameter. This does not appear to represent a fluid abscess. This is abutting the superior margin of the bladder and there is bladder wall thickening in that region which might be reactive.  Pelvis: Rest the bladder appears normal. The prostate gland is normal. Bones are unremarkable.      Impression: 8 cm area of inflammatory change superior to the bladder and anterior to the sigmoid colon. The sigmoid colon is abnormal with a long segment of wall thickening and the findings overall are suggestive of colitis and early abscess formation. The area of inflammation is complex without a definite drainable fluid collection. I do not believe that it could be successfully drained. It has the appearance of a phlegmon with multiple septa and no clear dominant fluid collection. Follow-up CT imaging after appropriate antibiotic therapy is recommended to see if this resolves.  There is wall thickening of the superior  portion of the bladder which might be secondary to the inflammatory abnormality adjacent to it.  15 mm low-density lesion in the liver which is likely a small cyst or other benign finding. This can be followed with the same CT scan that is done to follow-up this pelvic abnormality.   This report was finalized on 10/3/2023 12:51 PM by Dr. Gustavo Dubon MD.       My differential diagnosis for abdominal pain includes but is not limited to:  Gastritis, gastroenteritis, peptic ulcer disease, GERD, esophageal perforation, acute appendicitis, mesenteric adenitis, Meckel’s diverticulum, epiploic appendagitis, diverticulitis, colon cancer, ulcerative colitis, Crohn’s disease, intussusception, small bowel obstruction, adhesions, ischemic bowel, perforated viscus, ileus, obstipation, biliary colic, cholecystitis, cholelithiasis, Naman-Christos Jimmy, hepatitis, pancreatitis, common bile duct obstruction, cholangitis, bile leak, splenic trauma, splenic rupture, splenic infarction, splenic abscess, abdominal wall hematoma, abdominal wall abscess, intra-abdominal abscess, ascites, spontaneous bacterial peritonitis, hernia, UTI, cystitis, prostatitis, ureterolithiasis, urinary obstruction, AAA, myocardial infarction, pneumonia, cancer, porphyria, DKA, medications, sickle cell, viral syndrome, zoster                                       Medical Decision Making  Problems Addressed:  Acute diverticulitis: complicated acute illness or injury    Amount and/or Complexity of Data Reviewed  Labs: ordered. Decision-making details documented in ED Course.  Radiology: ordered.    Risk  OTC drugs.  Prescription drug management.        Final diagnoses:   Acute diverticulitis       ED Disposition  ED Disposition       ED Disposition   Discharge    Condition   Stable    Comment   --               PATIENT [facility] - [facility] [address]  [phone]  Schedule an appointment as soon as possible for a visit in 1 week  tomorrow to arrange  follow up as discussed., for continued or worsened symptoms, Sooner if needed         Medication List        New Prescriptions      amoxicillin-clavulanate 500-125 MG per tablet  Commonly known as: Augmentin  Take 1 tablet by mouth 3 (Three) Times a Day for 10 days.     saccharomyces boulardii 250 MG capsule  Commonly known as: FLORASTOR  Take 1 capsule by mouth 2 (Two) Times a Day for 14 days.            Stop      amoxicillin 875 MG tablet  Commonly known as: AMOXIL     predniSONE 10 MG (21) dose pack  Commonly known as: DELTASONE               Where to Get Your Medications        These medications were sent to [facility] [id number] - [place], [place] - [address] - [phone] [facility] [phone]   [address] [address]      Phone: [phone]   amoxicillin-clavulanate 500-125 MG per tablet  saccharomyces boulardii 250 MG capsule            David Yanez MD  10/03/23 6407     36.7

## 2023-11-01 LAB — SARS-COV-2 N GENE NPH QL NAA+PROBE: NOT DETECTED

## 2024-02-29 NOTE — ASU PATIENT PROFILE, ADULT - CLICK TO LAUNCH ORM
. Pt was found semi-supine in bed, +IVL, +LUE foam elevator*. Pt was found semi-supine in bed, +IVL, +LUE foam block

## 2025-05-02 ENCOUNTER — EMERGENCY (EMERGENCY)
Facility: HOSPITAL | Age: 60
LOS: 1 days | End: 2025-05-02
Attending: STUDENT IN AN ORGANIZED HEALTH CARE EDUCATION/TRAINING PROGRAM
Payer: COMMERCIAL

## 2025-05-02 VITALS
WEIGHT: 205.03 LBS | TEMPERATURE: 98 F | RESPIRATION RATE: 18 BRPM | SYSTOLIC BLOOD PRESSURE: 132 MMHG | DIASTOLIC BLOOD PRESSURE: 76 MMHG | OXYGEN SATURATION: 98 % | HEIGHT: 66 IN | HEART RATE: 76 BPM

## 2025-05-02 VITALS
SYSTOLIC BLOOD PRESSURE: 116 MMHG | TEMPERATURE: 98 F | OXYGEN SATURATION: 97 % | HEART RATE: 64 BPM | RESPIRATION RATE: 17 BRPM | DIASTOLIC BLOOD PRESSURE: 73 MMHG

## 2025-05-02 LAB
ALBUMIN SERPL ELPH-MCNC: 3.7 G/DL — SIGNIFICANT CHANGE UP (ref 3.5–5)
ALP SERPL-CCNC: 79 U/L — SIGNIFICANT CHANGE UP (ref 40–120)
ALT FLD-CCNC: 45 U/L DA — SIGNIFICANT CHANGE UP (ref 10–60)
ANION GAP SERPL CALC-SCNC: 7 MMOL/L — SIGNIFICANT CHANGE UP (ref 5–17)
APTT BLD: 34.4 SEC — SIGNIFICANT CHANGE UP (ref 26.1–36.8)
AST SERPL-CCNC: 22 U/L — SIGNIFICANT CHANGE UP (ref 10–40)
BASOPHILS # BLD AUTO: 0.04 K/UL — SIGNIFICANT CHANGE UP (ref 0–0.2)
BASOPHILS NFR BLD AUTO: 0.6 % — SIGNIFICANT CHANGE UP (ref 0–2)
BILIRUB SERPL-MCNC: 0.4 MG/DL — SIGNIFICANT CHANGE UP (ref 0.2–1.2)
BUN SERPL-MCNC: 16 MG/DL — SIGNIFICANT CHANGE UP (ref 7–18)
CALCIUM SERPL-MCNC: 9 MG/DL — SIGNIFICANT CHANGE UP (ref 8.4–10.5)
CHLORIDE SERPL-SCNC: 105 MMOL/L — SIGNIFICANT CHANGE UP (ref 96–108)
CO2 SERPL-SCNC: 25 MMOL/L — SIGNIFICANT CHANGE UP (ref 22–31)
CREAT SERPL-MCNC: 0.9 MG/DL — SIGNIFICANT CHANGE UP (ref 0.5–1.3)
EGFR: 97 ML/MIN/1.73M2 — SIGNIFICANT CHANGE UP
EGFR: 97 ML/MIN/1.73M2 — SIGNIFICANT CHANGE UP
EOSINOPHIL # BLD AUTO: 0.29 K/UL — SIGNIFICANT CHANGE UP (ref 0–0.5)
EOSINOPHIL NFR BLD AUTO: 4.5 % — SIGNIFICANT CHANGE UP (ref 0–6)
GLUCOSE SERPL-MCNC: 96 MG/DL — SIGNIFICANT CHANGE UP (ref 70–99)
HCT VFR BLD CALC: 45.1 % — SIGNIFICANT CHANGE UP (ref 39–50)
HGB BLD-MCNC: 15.5 G/DL — SIGNIFICANT CHANGE UP (ref 13–17)
IMM GRANULOCYTES NFR BLD AUTO: 0.5 % — SIGNIFICANT CHANGE UP (ref 0–0.9)
INR BLD: 1.01 RATIO — SIGNIFICANT CHANGE UP (ref 0.85–1.16)
LYMPHOCYTES # BLD AUTO: 1.56 K/UL — SIGNIFICANT CHANGE UP (ref 1–3.3)
LYMPHOCYTES # BLD AUTO: 24.1 % — SIGNIFICANT CHANGE UP (ref 13–44)
MCHC RBC-ENTMCNC: 30.6 PG — SIGNIFICANT CHANGE UP (ref 27–34)
MCHC RBC-ENTMCNC: 34.4 G/DL — SIGNIFICANT CHANGE UP (ref 32–36)
MCV RBC AUTO: 89.1 FL — SIGNIFICANT CHANGE UP (ref 80–100)
MONOCYTES # BLD AUTO: 0.46 K/UL — SIGNIFICANT CHANGE UP (ref 0–0.9)
MONOCYTES NFR BLD AUTO: 7.1 % — SIGNIFICANT CHANGE UP (ref 2–14)
NEUTROPHILS # BLD AUTO: 4.08 K/UL — SIGNIFICANT CHANGE UP (ref 1.8–7.4)
NEUTROPHILS NFR BLD AUTO: 63.2 % — SIGNIFICANT CHANGE UP (ref 43–77)
NRBC BLD AUTO-RTO: 0 /100 WBCS — SIGNIFICANT CHANGE UP (ref 0–0)
NT-PROBNP SERPL-SCNC: 20 PG/ML — SIGNIFICANT CHANGE UP (ref 0–125)
PLATELET # BLD AUTO: 222 K/UL — SIGNIFICANT CHANGE UP (ref 150–400)
POTASSIUM SERPL-MCNC: 4.3 MMOL/L — SIGNIFICANT CHANGE UP (ref 3.5–5.3)
POTASSIUM SERPL-SCNC: 4.3 MMOL/L — SIGNIFICANT CHANGE UP (ref 3.5–5.3)
PROT SERPL-MCNC: 7.5 G/DL — SIGNIFICANT CHANGE UP (ref 6–8.3)
PROTHROM AB SERPL-ACNC: 11.8 SEC — SIGNIFICANT CHANGE UP (ref 9.9–13.4)
RBC # BLD: 5.06 M/UL — SIGNIFICANT CHANGE UP (ref 4.2–5.8)
RBC # FLD: 12 % — SIGNIFICANT CHANGE UP (ref 10.3–14.5)
SODIUM SERPL-SCNC: 137 MMOL/L — SIGNIFICANT CHANGE UP (ref 135–145)
TROPONIN I, HIGH SENSITIVITY RESULT: 4.2 NG/L — SIGNIFICANT CHANGE UP
TROPONIN I, HIGH SENSITIVITY RESULT: 4.3 NG/L — SIGNIFICANT CHANGE UP
WBC # BLD: 6.46 K/UL — SIGNIFICANT CHANGE UP (ref 3.8–10.5)
WBC # FLD AUTO: 6.46 K/UL — SIGNIFICANT CHANGE UP (ref 3.8–10.5)

## 2025-05-02 PROCEDURE — 80053 COMPREHEN METABOLIC PANEL: CPT

## 2025-05-02 PROCEDURE — 84484 ASSAY OF TROPONIN QUANT: CPT

## 2025-05-02 PROCEDURE — 85610 PROTHROMBIN TIME: CPT

## 2025-05-02 PROCEDURE — 93005 ELECTROCARDIOGRAM TRACING: CPT

## 2025-05-02 PROCEDURE — 85025 COMPLETE CBC W/AUTO DIFF WBC: CPT

## 2025-05-02 PROCEDURE — 83880 ASSAY OF NATRIURETIC PEPTIDE: CPT

## 2025-05-02 PROCEDURE — 99283 EMERGENCY DEPT VISIT LOW MDM: CPT | Mod: 25

## 2025-05-02 PROCEDURE — 99285 EMERGENCY DEPT VISIT HI MDM: CPT

## 2025-05-02 PROCEDURE — 71045 X-RAY EXAM CHEST 1 VIEW: CPT | Mod: 26

## 2025-05-02 PROCEDURE — 85730 THROMBOPLASTIN TIME PARTIAL: CPT

## 2025-05-02 PROCEDURE — 93010 ELECTROCARDIOGRAM REPORT: CPT

## 2025-05-02 PROCEDURE — 71045 X-RAY EXAM CHEST 1 VIEW: CPT

## 2025-05-02 PROCEDURE — 36415 COLL VENOUS BLD VENIPUNCTURE: CPT

## 2025-05-02 RX ORDER — ASPIRIN 325 MG
325 TABLET ORAL ONCE
Refills: 0 | Status: COMPLETED | OUTPATIENT
Start: 2025-05-02 | End: 2025-05-02

## 2025-05-02 RX ADMIN — Medication 325 MILLIGRAM(S): at 10:51

## 2025-05-02 NOTE — ED PROVIDER NOTE - NSFOLLOWUPINSTRUCTIONS_ED_ALL_ED_FT
Chest Pain    WHAT YOU NEED TO KNOW:    What do I need to know about chest pain? Chest pain can be caused by a range of conditions, from not serious to life-threatening. It is important to follow up with your healthcare provider to find the cause of your chest pain.    What may cause or increase my risk for chest pain?    A digestion problem, such as acid reflux or a stomach ulcer    An anxiety attack or a strong emotion, such as anger    Infection, inflammation, or a fracture in the bones or cartilage in your chest    Poor blood flow to your heart (angina)    A life-threatening condition, such as a heart attack or blood clot in your lungs  What other symptoms might I have with chest pain?    A burning feeling behind your breastbone    A racing or slow heartbeat    Fever or sweating    Nausea or vomiting    Shortness of breath    Discomfort or pressure that spreads from your chest to your back, jaw, or arm    Feeling weak, tired, or faint  How is the cause of chest pain diagnosed? Your healthcare provider will examine you. Describe your chest pain in as much detail as possible. Tell him or her where your pain is and when it began. Tell the provider if you notice anything that makes the pain worse or better. Tell him or her if it is constant or comes and goes. Also include any other symptoms you have with the chest pain, such as sweating or nausea. Your provider will ask about any medicines you use and medical conditions you have. Tell him or her if you have a family history of heart disease. You may also need any of the following tests:    An EKG is a test that records your heart's electrical activity.    Blood tests check for heart damage and signs of a heart attack.    An echocardiogram uses sound waves to see if blood is flowing normally through your heart.    An ultrasound, x-ray, CT, or MRI scan may show the cause of your chest pain. You may be given contrast liquid to help your heart show up better in the pictures. Tell the healthcare provider if you have ever had an allergic reaction to contrast liquid. Do not enter the MRI room with anything metal. Metal can cause serious injury. Tell the provider if you have any metal in or on your body.    An endoscopy may be done to check for ulcers or problem with your esophagus.  Upper Endoscopy  How is chest pain treated?    Medicines may be given to treat the cause of your chest pain. Examples include pain medicine, anxiety medicine, or medicines to increase blood flow to your heart. Do not take certain medicines without asking your healthcare provider first. These include NSAIDs, herbal or vitamin supplements, and hormones, such as estrogen or progestin.    A stent may be placed if your chest pain is caused by blockage in your heart. A stent is a wire mesh tube that helps hold your artery open. You may need more than 1 stent.  Coronary Artery Angioplasty (Stent)  What are some healthy living tips? If the cause of your chest pain is known, your healthcare provider will give you specific guidelines to follow. The following are general healthy guidelines:    Do not smoke. Nicotine and other chemicals in cigarettes and cigars can cause lung and heart damage. Ask your healthcare provider for information if you currently smoke and need help to quit. E-cigarettes or smokeless tobacco still contain nicotine. Talk to your healthcare provider before you use these products.    Choose a variety of healthy foods as often as possible. Include fresh, frozen, or canned fruits and vegetables. Also include low-fat dairy products, fish, chicken (without skin), and lean meats. Your healthcare provider or a dietitian can help you create meal plans. You may need to avoid certain foods or drinks if your pain is caused by a digestion problem.  Healthy Foods      Lower your sodium (salt) intake. Limit foods that are high in sodium, such as canned foods, salty snacks, and cold cuts. If you add salt when you cook food, do not add more at the table. Choose low-sodium canned foods as much as possible.        Drink plenty of water every day. Water helps your body to control your temperature and blood pressure. Ask your healthcare provider how much water you should drink every day.    Ask about activity. Your healthcare provider will tell you which activities to limit or avoid. Ask when you can drive, return to work, and have sex. Ask about the best exercise plan for you.    Maintain a healthy weight. Ask your healthcare provider what a healthy weight is for you. Ask him or her to help you create a weight loss plan if you are overweight.    Ask about vaccines you may need. Your healthcare provider can tell you which vaccines you need, and when to get them. The following vaccines help prevent diseases that can become serious for a person with a heart condition:  The influenza (flu) vaccine is given each year. Get a flu vaccine as soon as recommended, usually in September or October.    The pneumonia vaccine is usually given every 5 years. Your healthcare provider may recommend the pneumonia vaccine if you are 65 or older.    COVID-19 vaccines are given to adults as a shot. At least 1 dose of an updated vaccine is recommended for all adults. COVID-19 vaccines are updated throughout the year. Adults 65 or older need a second dose of updated vaccine at least 4 months after the first dose. Your healthcare provider can help you schedule all needed doses as updated vaccines become available.  Prevent Heart Disease   Call your local emergency number (911 in the [place]) or have someone call if:    You have any of the following signs of a heart attack:  Squeezing, pressure, or pain in your chest    You may also have any of the following:  Discomfort or pain in your back, neck, jaw, stomach, or arm    Shortness of breath    Nausea or vomiting    Lightheadedness or a sudden cold sweat    When should I seek immediate care?    You have chest discomfort that gets worse, even with medicine.    You cough or vomit blood.    Your bowel movements are black or bloody.    You cannot stop vomiting, or it hurts to swallow.  When should I call my doctor?    You have questions or concerns about your condition or care.    CARE AGREEMENT:    You have the right to help plan your care. Learn about your health condition and how it may be treated. Discuss treatment options with your healthcare providers to decide what care you want to receive. You always have the right to refuse treatment.

## 2025-05-02 NOTE — ED PROVIDER NOTE - NSFOLLOWUPCLINICS_GEN_ALL_ED_FT
[facility]  Cardiology  [address], [address]  [place] [address]  Phone: [phone]  Fax:

## 2025-05-02 NOTE — ED ADULT NURSE NOTE - OBJECTIVE STATEMENT
pt is here for chest pain.  pt stated that left chest pain with shortness of breath on/off since yesterday, pain radiating to left arm, a/ox4,

## 2025-05-02 NOTE — ED PROVIDER NOTE - OBJECTIVE STATEMENT
No 59-year-old presenting with chest pain symptoms started today denies any nausea vomiting endorses also noting shortness of breath denies any fever chills travel sick

## 2025-05-02 NOTE — ED PROVIDER NOTE - PATIENT PORTAL LINK FT
You can access the FollowMyHealth Patient Portal offered by [facility] by registering at the following website: [email or web address]. By joining [facility]’s FollowMyHealth portal, you will also be able to view your health information using other applications (apps) compatible with our system.

## 2025-05-02 NOTE — ED PROVIDER NOTE - CLINICAL SUMMARY MEDICAL DECISION MAKING FREE TEXT BOX
Patient presented chest pain otherwise vital stable no PE risk factors no calf pain swelling to suggest DVT or fluid overload will obtain labs x-ray assess for ACS ED observation reassess

## 2025-05-02 NOTE — ED ADULT NURSE NOTE - NSFALLUNIVINTERV_ED_ALL_ED
Bed/Stretcher in lowest position, wheels locked, appropriate side rails in place/Call bell, personal items and telephone in reach/Instruct patient to call for assistance before getting out of bed/chair/stretcher/Non-slip footwear applied when patient is off stretcher/[place] to call system/Physically safe environment - no spills, clutter or unnecessary equipment/Purposeful proactive rounding/Room/bathroom lighting operational, light cord in reach

## 2025-05-02 NOTE — ED PROVIDER NOTE - PROGRESS NOTE DETAILS
patient well-appearing lab negative for acute finding offered patient admission for monitoring patient declined states that he wished to follow-up outpatient but agreeable to second Trope second Trope negative patient remains adamant he wished to follow-up outpatient patient given cardiology follow-up return precaution instructed follow-up PMD patient clinically stable neurovascular intact on discharge

## (undated) DEVICE — DRSG TEGADERM 4X4.75"

## (undated) DEVICE — GLV 7 PROTEXIS

## (undated) DEVICE — NDL HYPO SAFE 25G X 1.5"

## (undated) DEVICE — DRAPE LIGHT HANDLE COVER BLUE

## (undated) DEVICE — WRAP COMPRESSION CALF MED

## (undated) DEVICE — SOL IRR POUR NS 0.9% 1500ML

## (undated) DEVICE — DRSG MASTISOL

## (undated) DEVICE — GLV 7.5 PROTEXIS

## (undated) DEVICE — SOL IRR POUR H2O 500ML

## (undated) DEVICE — BLANKET WARMER LOWER ADULT

## (undated) DEVICE — DRAPE LAPAROTOMY TRANSVERSE

## (undated) DEVICE — PACK MINOR NO DRAPE

## (undated) DEVICE — SUT POLYSORB 4-0 27" P-12 UNDYED

## (undated) DEVICE — GOWN XL

## (undated) DEVICE — DRAPE HALF SHEET 40X57"

## (undated) DEVICE — ELCTR GROUNDING PAD ADULT COVIDIEN

## (undated) DEVICE — SUT POLYSORB 3-0 30" V-20 UNDYED

## (undated) DEVICE — FOR-ESU VALLEYLAB T7E14830DX: Type: DURABLE MEDICAL EQUIPMENT

## (undated) DEVICE — DRSG 4X4